# Patient Record
Sex: FEMALE | Race: WHITE
[De-identification: names, ages, dates, MRNs, and addresses within clinical notes are randomized per-mention and may not be internally consistent; named-entity substitution may affect disease eponyms.]

---

## 2017-06-08 ENCOUNTER — HOSPITAL ENCOUNTER (OUTPATIENT)
Dept: HOSPITAL 62 - RAD | Age: 57
End: 2017-06-08
Attending: PHYSICIAN ASSISTANT
Payer: SELF-PAY

## 2017-06-08 DIAGNOSIS — G44.52: Primary | ICD-10-CM

## 2017-06-08 PROCEDURE — 70553 MRI BRAIN STEM W/O & W/DYE: CPT

## 2017-06-08 PROCEDURE — A9577 INJ MULTIHANCE: HCPCS

## 2017-06-08 PROCEDURE — 82565 ASSAY OF CREATININE: CPT

## 2017-06-08 NOTE — RADIOLOGY REPORT (SQ)
EXAM DESCRIPTION:  MRI HEAD COMBO



COMPLETED DATE/TIME:  6/8/2017 2:34 pm



REASON FOR STUDY:  NEW DAILY PERSISTENT HEADACHE (G44.52) G44.52  NEW DAILY PERSISTENT HEADACHE (NDPH
)



COMPARISON:  None.



TECHNIQUE:  Multiplanar imaging includes noncontrasted T1, T2, FLAIR, diffusion with ADC map and post
gadolinium contrast T1 sequences. Images stored on PACS.



CONTRAST TYPE AND DOSE:  15 mL Multihance.



RENAL FUNCTION:  GFR > 60.



LIMITATIONS:  None.



FINDINGS:  ANATOMY: No anomalies. Normal vascular flow voids. Pituitary fossa normal.

CSF SPACES: Normal in size and contour. No hemorrhage.

CEREBRUM: Sulci and gyri normal in size and contour. Normal white matter signal on FLAIR imaging. No 
evidence of hemorrhage, mass, or extraaxial fluid collection. No abnormal enhancement post contrast.

POSTERIOR FOSSA: No signal alteration. No hemorrhage. No edema, masses, or mass effect. Internal nancy
tory canals, cerebellopontine angles, mastoids normal. No enhancing lesions. No abnormal enhancement 
post contrast.

DIFFUSION IMAGING: Negative for acute or subacute infarction.

ORBITS: No masses. Globes normal.

PARANASAL SINUSES: No fluid levels. Mucosa normal.

OTHER: No other significant finding.



IMPRESSION:  Normal brain.



TECHNICAL DOCUMENTATION:  JOB ID:  [id number]

 2011 [employer]- All Rights Reserved

## 2019-04-10 ENCOUNTER — HOSPITAL ENCOUNTER (OUTPATIENT)
Dept: HOSPITAL 62 - SC | Age: 59
Discharge: HOME | End: 2019-04-10
Attending: PODIATRIST
Payer: COMMERCIAL

## 2019-04-10 DIAGNOSIS — M77.32: Primary | ICD-10-CM

## 2019-04-10 PROCEDURE — 73650 X-RAY EXAM OF HEEL: CPT

## 2019-04-10 PROCEDURE — 28060 PARTIAL REMOVAL FOOT FASCIA: CPT

## 2019-04-10 PROCEDURE — 01480 ANES OPEN PX LOWER L/A/F NOS: CPT

## 2019-04-10 NOTE — [facility] OPERATIVE REPORT E
[facility] Operative Report



NAME: YARA CHU

                                      MRN: [id number]

                             AGE: 58Y

DATE OF SURGERY: 04/10/2019                   ROOM:



PREOPERATIVE DIAGNOSIS:

PLANTAR FASCIITIS, LEFT FOOT.



POSTOPERATIVE DIAGNOSIS:

PLANTAR FASCIITIS, LEFT FOOT.



OPERATION:

Partial plantar fasciectomy, left foot.



SURGEON:

NADER STAHL DPM



ASSISTANT:

Deejay Hair DPM



PROCEDURE:

Following the induction of IV and regional local anesthesia, the left foot

and leg was prepped and draped in the usual sterile manner.  A pneumatic

tourniquet was placed around the left ankle and inflated to 250 mmHg, after

exsanguination of limb via Esmarch bandage.



The following surgical procedure was then performed:  Partial plantar

fasciectomy, left foot.  Attention was directed to the plantar aspect of

the left foot, at the heel.  An x-ray was taken to make sure that we were

in the correct position; that is, we wanted to be just distal to the base

of the calcaneus.  This was approximately 5.5 cm distal from the end of her

foot.  A 3-cm transverse incision was made.  The incision was deepened via

blunt dissection.  All bleeders were clamped and Bovied as necessary for

the purposes of hemostasis.  The incision was deepened to the level of the

plantar fascia, again utilizing blunt dissection.  The plantar fascia was

sharply incised from the medial aspect long term across.  An approximately

1-cm wedge of fascia was removed distally via sharp dissection, and an

approximately 1-cm wedge of fascia was removed plantarly.



The site was inspected.  The calcaneal bone could be palpated.  The foot

was dorsiflexed and the plantar fascia was no longer tight.  The area was

then flushed with copious amounts of an antibacterial saline solution.  The

subcutaneous tissue was then coapted and maintained utilizing simple

interrupted sutures of 3-0 Vicryl, and the skin was coapted and maintained

utilizing horizontal mattress sutures of 4-0 nylon.  A dry sterile dressing

was then applied, consisting of Alen silk, 4 x 4's, Conform, Kerlix and

Coban.



The pneumatic tourniquet was released.  It was noted all digits were warm

and viable, and the patient was transferred to the recovery room.





DICTATING PHYSICIAN: NADER STAHL D.P.M.



[id number]              DT: 04/10/2019 0938

PHY#: [id number]          DD: 04/10/2019 0925

ID:   [id number]               JOB#: [id number]       ACCT: [id number]



cc:NADER STAHL DPM

>

## 2019-04-10 NOTE — [facility]IC[facility] DISCHARGE SUMMARY E
[facility] Discharge Summary



NAME: YARA CHU

                                      MRN: [id number]

                                AGE: 58Y

ADMITTED: 04/10/2019           DISCHARGED: 04/10/2019



PROCEDURE:

Partial plantar fasciectomy, left foot.



POSTOPERATIVE DIAGNOSIS:

Plantar fasciitis, left foot.



SURGEON:

Venita Mae DPM



ASSISTANT:

Deejay Hair DPM



HOSPITAL COURSE:

The patient was admitted to [facility] with chief complaint of a painful

left heel.  She had undergone many months of conservative therapy, with no

cessation of her symptoms, and a followup diagnostic ultrasound showed that

there was still a large amount of inflammation of the plantar fascia.  The

patient desired to have this problem surgically corrected.



She underwent the above surgical procedure without any complications and

was transferred to the recovery room.  The patient was discharged with a

surgical shoe and ice pack; postoperative instructions, including no

weightbearing on the operative foot; postoperative prescriptions for

Percocet 5/325 mg, #3; Phenergan 25 mg, #15; and cephalexin 500 mg, #4. 

She was given a followup appointment in the doctor's office on April 16th

and the patient was discharged from [facility].



DICTATING PHYSICIAN: VENITA MAE D.P.M.



[id number]              DT: 04/10/2019 0945

PHY#: [id number]          DD: 04/10/2019 0927

ID:   [id number]               JOB#: [id number]       ACCT: [id number]



cc:VENITA MAE DPM

>

## 2019-04-10 NOTE — RADIOLOGY REPORT (SQ)
EXAM DESCRIPTION:  OS CALCIS/HEEL LEFT



COMPLETED DATE/TIME:  4/10/2019 9:18 am



REASON FOR STUDY:  PLANTAR FASCIOTOMY



COMPARISON:  None.



FINDINGS:  Fluoro time 2 seconds.

3 images of the os calcis, side not labeled with probe along the plantar soft tissues.  Please correl
ate with operative note.



IMPRESSION:  Imaging obtained during reported plantar fasciotomy.



TECHNICAL DOCUMENTATION:  JOB ID:  [id number]



Reading location - IP/workstation name: [email or web address]

## 2019-04-10 NOTE — RADIOLOGY REPORT (SQ)
EXAM DESCRIPTION:  NO CHG FLUORO



COMPLETED DATE/TIME:  4/10/2019 9:18 am



REASON FOR STUDY:  PLANTAR FASCIOTOMY M77.32  CALCANEAL SPUR, LEFT FOOT



COMPARISON:  None.



FLUOROSCOPY TIME:  4 seconds

3 images saved to PACS



TECHNIQUE:  Intra-operative images acquired during surgical procedure to evaluate progress.

NUMBER OF IMAGES: 3



LIMITATIONS:  None.



FINDINGS:  Intraoperative images obtained to evaluate progress.  Please see operative report for deta
iled description.



IMPRESSION:  IMAGE(S) OBTAINED DURING PROCEDURE.



COMMENT:  Quality :  Final reports for procedures using fluoroscopy that document radiation exp
osure indices, or exposure time and number of fluorographic images (if radiation exposure indices are
 not available)

Please consult full operative report of the attending physician for description of the procedure.



TECHNICAL DOCUMENTATION:  JOB ID:  [id number]

 2011 [employer]- All Rights Reserved



Reading location - IP/workstation name: [email or web address]